# Patient Record
Sex: MALE | Race: WHITE
[De-identification: names, ages, dates, MRNs, and addresses within clinical notes are randomized per-mention and may not be internally consistent; named-entity substitution may affect disease eponyms.]

---

## 2018-01-16 ENCOUNTER — HOSPITAL ENCOUNTER (EMERGENCY)
Dept: HOSPITAL 92 - ERS | Age: 53
Discharge: HOME | End: 2018-01-16
Payer: SELF-PAY

## 2018-01-16 DIAGNOSIS — F17.210: ICD-10-CM

## 2018-01-16 DIAGNOSIS — N13.2: Primary | ICD-10-CM

## 2018-01-16 DIAGNOSIS — Z71.6: ICD-10-CM

## 2018-01-16 LAB
ALBUMIN SERPL BCG-MCNC: 3.8 G/DL (ref 3.5–5)
ALP SERPL-CCNC: 66 U/L (ref 40–150)
ALT SERPL W P-5'-P-CCNC: 70 U/L (ref 8–55)
ANION GAP SERPL CALC-SCNC: 11 MMOL/L (ref 10–20)
AST SERPL-CCNC: 43 U/L (ref 5–34)
BASOPHILS # BLD AUTO: 0 THOU/UL (ref 0–0.2)
BASOPHILS NFR BLD AUTO: 0.6 % (ref 0–1)
BILIRUB SERPL-MCNC: 0.4 MG/DL (ref 0.2–1.2)
BUN SERPL-MCNC: 15 MG/DL (ref 8.9–20.6)
CALCIUM SERPL-MCNC: 8.8 MG/DL (ref 7.8–10.44)
CHLORIDE SERPL-SCNC: 107 MMOL/L (ref 98–107)
CO2 SERPL-SCNC: 25 MMOL/L (ref 22–29)
CREAT CL PREDICTED SERPL C-G-VRATE: 0 ML/MIN (ref 70–130)
EOSINOPHIL # BLD AUTO: 0.1 THOU/UL (ref 0–0.7)
EOSINOPHIL NFR BLD AUTO: 1.7 % (ref 0–10)
GLOBULIN SER CALC-MCNC: 2.9 G/DL (ref 2.4–3.5)
GLUCOSE SERPL-MCNC: 169 MG/DL (ref 70–105)
GLUCOSE UR STRIP-MCNC: 250 MG/DL
HGB BLD-MCNC: 13.5 G/DL (ref 14–18)
LYMPHOCYTES # BLD: 2 THOU/UL (ref 1.2–3.4)
LYMPHOCYTES NFR BLD AUTO: 25.4 % (ref 21–51)
MCH RBC QN AUTO: 31.2 PG (ref 27–31)
MCV RBC AUTO: 92.8 FL (ref 80–94)
MONOCYTES # BLD AUTO: 1 THOU/UL (ref 0.11–0.59)
MONOCYTES NFR BLD AUTO: 12.8 % (ref 0–10)
NEUTROPHILS # BLD AUTO: 4.7 THOU/UL (ref 1.4–6.5)
NEUTROPHILS NFR BLD AUTO: 59.5 % (ref 42–75)
PLATELET # BLD AUTO: 323 THOU/UL (ref 130–400)
POTASSIUM SERPL-SCNC: 3.4 MMOL/L (ref 3.5–5.1)
RBC # BLD AUTO: 4.33 MILL/UL (ref 4.7–6.1)
SODIUM SERPL-SCNC: 140 MMOL/L (ref 136–145)
SP GR UR STRIP: 1.02 (ref 1–1.04)
WBC # BLD AUTO: 7.9 THOU/UL (ref 4.8–10.8)

## 2018-01-16 PROCEDURE — 74177 CT ABD & PELVIS W/CONTRAST: CPT

## 2018-01-16 PROCEDURE — 80053 COMPREHEN METABOLIC PANEL: CPT

## 2018-01-16 PROCEDURE — 81003 URINALYSIS AUTO W/O SCOPE: CPT

## 2018-01-16 PROCEDURE — 96374 THER/PROPH/DIAG INJ IV PUSH: CPT

## 2018-01-16 PROCEDURE — 99406 BEHAV CHNG SMOKING 3-10 MIN: CPT

## 2018-01-16 PROCEDURE — 96375 TX/PRO/DX INJ NEW DRUG ADDON: CPT

## 2018-01-16 PROCEDURE — 36415 COLL VENOUS BLD VENIPUNCTURE: CPT

## 2018-01-16 PROCEDURE — 85025 COMPLETE CBC W/AUTO DIFF WBC: CPT

## 2018-01-16 NOTE — CT
PRELIMINARY REPORT/[facility]/EMERGENCY AFTER

HOURS PROCEDURE:

 

EXAM:

CT Abdomen and Pelvis With Intravenous Contrast

 

EXAM DATE/TIME:

Exam ordered 1/16/2018 4:22 AM

 

CLINICAL HISTORY:

52 years old, male; Pain; Abdominal pain; Localized; Right lower quadrant (rlq); Patient HX: Pt C/O r
lq pain ongoing for 3 days. Denies nausea or vomiting.

 

TECHNIQUE:

Axial computed tomography images of the abdomen and pelvis with intravenous contrast.

Coronal reformatted images were created and reviewed.

 

CONTRAST:

95 mL of ISOVUE 370 administered intravenously.

 

COMPARISON:

No relevant prior studies available.

 

FINDINGS:

Lower thorax: There is subpleural atelectasis of the dependent portions of the lungs.

 

ABDOMEN:

Liver: There is a focal right liver lobe hypodensity that cannot be further characterized on the curr
ent examination.

Gallbladder and bile ducts: The gallbladder is normal. There is no evidence of biliary ductal dilatio
n.  No calcified stones.

Pancreas: The pancreas is normal. No ductal dilation.

Spleen: The spleen is normal.

Adrenals: The adrenal glands are normal.

Kidneys and ureters: There is a 5 x 5 x 8 mm proximal RIGHT ureteral obstructing calculus with associ
ated moderate RIGHT hydronephrosis. There is a focal renal hypodensity that cannot be further charact
erized on the current examination. The left kidney is normal.

Stomach and bowel: The stomach is normal. The duodenum is unremarkable. Mild diverticulosis is presen
t in the sigmoid and descending colon. The colon is normal. No obstruction. No mucosal thickening.

Appendix: A normal appendix is identified.

 

PELVIS:

Bladder: The bladder is decompressed but otherwise normal.

Reproductive: The prostate gland and seminal vesicles are normal.

 

ABDOMEN and PELVIS:

Intraperitoneal space: Normal. No free air. No significant fluid collection.

Bones/joints: No acute fracture. No dislocation.

Soft tissues: Normal.

Vasculature: Normal. No abdominal aortic aneurysm.

Lymph nodes: Normal. No enlarged lymph nodes.

 

IMPRESSION:

There is a 5 x 5 x 8 mm proximal RIGHT ureteral obstructing calculus with associated moderate RIGHT h
ydronephrosis.

 

Thank you for allowing us to participate in the care of your patient.

 

Dictated and Authenticated by: David Cannon MD

01/16/2018 4:54 AM Central Time ([place] & [place])

 

 

FINAL REPORT

CT ABDOMEN AND PELVIS WITH IV CONTRAST:

 

Date: 1-16-18 

 

Performed on emergency basis at 0423 hours. 

 

History: Right abdominal pain. 

 

FINDINGS: 

No comparison. The findings agree with the preliminary report by Dr. Angel from [facility]. Th
ere is partial obstruction of the proximal right ureter with a 0.8 cm calculus. Tiny nonobstructing l
eft renal calculus is also present. Small irregularly shaped low density lesion within the posterior 
dome of the liver is incompletely evaluated and may represent a hemangioma. 

 

Code QA

 

POS: [facility]